# Patient Record
Sex: MALE | Race: WHITE | NOT HISPANIC OR LATINO | Employment: FULL TIME | URBAN - METROPOLITAN AREA
[De-identification: names, ages, dates, MRNs, and addresses within clinical notes are randomized per-mention and may not be internally consistent; named-entity substitution may affect disease eponyms.]

---

## 2021-03-03 DIAGNOSIS — Z23 NEED FOR VACCINATION: ICD-10-CM

## 2021-04-30 ENCOUNTER — HOSPITAL ENCOUNTER (OUTPATIENT)
Dept: LAB | Facility: MEDICAL CENTER | Age: 66
End: 2021-04-30
Attending: FAMILY MEDICINE
Payer: COMMERCIAL

## 2021-04-30 LAB
ALBUMIN SERPL BCP-MCNC: 4.3 G/DL (ref 3.2–4.9)
ALBUMIN/GLOB SERPL: 1.5 G/DL
ALP SERPL-CCNC: 113 U/L (ref 30–99)
ALT SERPL-CCNC: 31 U/L (ref 2–50)
ANION GAP SERPL CALC-SCNC: 9 MMOL/L (ref 7–16)
AST SERPL-CCNC: 26 U/L (ref 12–45)
BILIRUB SERPL-MCNC: 0.3 MG/DL (ref 0.1–1.5)
BUN SERPL-MCNC: 20 MG/DL (ref 8–22)
CALCIUM SERPL-MCNC: 9.3 MG/DL (ref 8.5–10.5)
CHLORIDE SERPL-SCNC: 105 MMOL/L (ref 96–112)
CHOLEST SERPL-MCNC: 208 MG/DL (ref 100–199)
CO2 SERPL-SCNC: 24 MMOL/L (ref 20–33)
CREAT SERPL-MCNC: 0.87 MG/DL (ref 0.5–1.4)
FASTING STATUS PATIENT QL REPORTED: NORMAL
GLOBULIN SER CALC-MCNC: 2.8 G/DL (ref 1.9–3.5)
GLUCOSE SERPL-MCNC: 140 MG/DL (ref 65–99)
HDLC SERPL-MCNC: 32 MG/DL
LDLC SERPL CALC-MCNC: 102 MG/DL
POTASSIUM SERPL-SCNC: 4.2 MMOL/L (ref 3.6–5.5)
PROT SERPL-MCNC: 7.1 G/DL (ref 6–8.2)
PSA SERPL-MCNC: 3.78 NG/ML (ref 0–4)
SODIUM SERPL-SCNC: 138 MMOL/L (ref 135–145)
TRIGL SERPL-MCNC: 372 MG/DL (ref 0–149)
TSH SERPL DL<=0.005 MIU/L-ACNC: 13.8 UIU/ML (ref 0.38–5.33)

## 2021-04-30 PROCEDURE — 82570 ASSAY OF URINE CREATININE: CPT

## 2021-04-30 PROCEDURE — 82043 UR ALBUMIN QUANTITATIVE: CPT

## 2021-04-30 PROCEDURE — 80061 LIPID PANEL: CPT

## 2021-04-30 PROCEDURE — 80053 COMPREHEN METABOLIC PANEL: CPT

## 2021-04-30 PROCEDURE — 36415 COLL VENOUS BLD VENIPUNCTURE: CPT

## 2021-04-30 PROCEDURE — 84443 ASSAY THYROID STIM HORMONE: CPT

## 2021-04-30 PROCEDURE — 84153 ASSAY OF PSA TOTAL: CPT

## 2021-05-01 LAB
CREAT UR-MCNC: 179.09 MG/DL
MICROALBUMIN UR-MCNC: 8.6 MG/DL
MICROALBUMIN/CREAT UR: 48 MG/G (ref 0–30)

## 2021-08-06 ENCOUNTER — HOSPITAL ENCOUNTER (OUTPATIENT)
Dept: LAB | Facility: MEDICAL CENTER | Age: 66
End: 2021-08-06
Attending: FAMILY MEDICINE
Payer: COMMERCIAL

## 2021-08-06 LAB — TSH SERPL DL<=0.005 MIU/L-ACNC: 9.62 UIU/ML (ref 0.38–5.33)

## 2021-08-06 PROCEDURE — 84443 ASSAY THYROID STIM HORMONE: CPT

## 2021-08-06 PROCEDURE — 36415 COLL VENOUS BLD VENIPUNCTURE: CPT

## 2021-11-18 ENCOUNTER — OFFICE VISIT (OUTPATIENT)
Dept: URGENT CARE | Facility: PHYSICIAN GROUP | Age: 66
End: 2021-11-18
Payer: COMMERCIAL

## 2021-11-18 VITALS
RESPIRATION RATE: 20 BRPM | OXYGEN SATURATION: 96 % | TEMPERATURE: 97 F | HEIGHT: 72 IN | DIASTOLIC BLOOD PRESSURE: 88 MMHG | BODY MASS INDEX: 32.51 KG/M2 | HEART RATE: 79 BPM | SYSTOLIC BLOOD PRESSURE: 154 MMHG | WEIGHT: 240 LBS

## 2021-11-18 DIAGNOSIS — R07.9 CHEST PAIN, UNSPECIFIED TYPE: ICD-10-CM

## 2021-11-18 DIAGNOSIS — I10 HYPERTENSION, UNSPECIFIED TYPE: ICD-10-CM

## 2021-11-18 PROCEDURE — 99204 OFFICE O/P NEW MOD 45 MIN: CPT | Performed by: PHYSICIAN ASSISTANT

## 2021-11-18 RX ORDER — LEVOTHYROXINE SODIUM 88 UG/1
TABLET ORAL
COMMUNITY
Start: 2021-11-07

## 2021-11-18 ASSESSMENT — ENCOUNTER SYMPTOMS
DIZZINESS: 0
PALPITATIONS: 0
FEVER: 0
COUGH: 0
BLURRED VISION: 0
VOMITING: 0
NAUSEA: 0
SHORTNESS OF BREATH: 0
CHILLS: 0

## 2021-11-19 NOTE — PROGRESS NOTES
Subjective:     Richard Duane Abend  is a 66 y.o. male who presents for Chest Pain (on and off x 1 week )      Chest Pain   Pertinent negatives include no cough, dizziness, fever, nausea, palpitations, shortness of breath or vomiting.     Patient presents urgent care complaining of last 1 week of chest pain.  He notes intermittent episodes of chest pain he describes as tightness that radiates across the chest.  At the same time he feels hot flashes.  He denies nausea or vomiting associated.  He states his episodes do occur while exerting himself such as going up a flight of stairs or walking uphill.  He denies past medical history of any cardiac episodes.  Denies history of heart attacks or known arrhythmias.  He denies feeling palpitations.  He denies dizziness or visual changes.  He notes past medical history of hypertension without medication management.  He also acknowledges history of elevated cholesterol.  He states he did see his primary care recently and he listened to his heart and stated that it sounds good.  He denies treatments tried.  He states is been going on for 1 week.  He denies cough, respiratory symptoms or any shortness of breath.    Review of Systems   Constitutional: Negative for chills and fever.   Eyes: Negative for blurred vision.   Respiratory: Negative for cough and shortness of breath.    Cardiovascular: Positive for chest pain. Negative for palpitations and leg swelling.   Gastrointestinal: Negative for nausea and vomiting.   Skin: Negative for rash.   Neurological: Negative for dizziness.       Medications:    • levothyroxine Tabs    Allergies: Patient has no known allergies.    Problem List: Richard Duane Abend does not have a problem list on file.    Surgical History:  No past surgical history on file.    Past Social Hx: Richard Duane Abend  reports that he has quit smoking. He has quit using smokeless tobacco. He reports previous alcohol use. He reports that he does not use drugs.      Past Family Hx:  Richard Duane Abend family history is not on file.     Problem list, medications, and allergies reviewed by myself today in Epic.     Objective:   /88 (BP Location: Left arm, Patient Position: Sitting, BP Cuff Size: Large adult)   Pulse 79   Temp 36.1 °C (97 °F) (Temporal)   Resp 20   Ht 1.829 m (6')   Wt 109 kg (240 lb)   SpO2 96%   BMI 32.55 kg/m²     Physical Exam  Vitals and nursing note reviewed.   Constitutional:       General: He is not in acute distress.     Appearance: He is well-developed. He is not diaphoretic.   HENT:      Head: Normocephalic and atraumatic.      Right Ear: External ear normal.      Left Ear: External ear normal.      Nose: Nose normal.   Eyes:      General: No scleral icterus.        Right eye: No discharge.         Left eye: No discharge.      Conjunctiva/sclera: Conjunctivae normal.   Cardiovascular:      Rate and Rhythm: Normal rate and regular rhythm.  No extrasystoles are present.     Pulses: Normal pulses.      Heart sounds: Normal heart sounds.   Pulmonary:      Effort: Pulmonary effort is normal. No respiratory distress.      Breath sounds: Normal breath sounds and air entry.   Musculoskeletal:         General: Normal range of motion.      Cervical back: Neck supple.      Right lower leg: No edema.      Left lower leg: No edema.   Skin:     General: Skin is warm and dry.      Coloration: Skin is not pale.   Neurological:      Mental Status: He is alert and oriented to person, place, and time.      Coordination: Coordination normal.       EKG in the office reveals a normal sinus rhythm with a rate of 58. 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 Diminished T waves laterally.    Assessment/Plan:   Assessment      1. Chest pain, unspecified type  - EKG    2. Hypertension, unspecified type    Other orders  - levothyroxine (SYNTHROID) 88 MCG Tab      Patient has been directed to [facility] ER for further management/work up now,  today.    Patient's wife will transport him to [facility] for further work-up now today.    I have worn an N95 mask, gloves and eye protection for the entire encounter with this patient.     Differential diagnosis, natural history, supportive care, and indications for immediate follow-up discussed.

## 2022-01-28 ENCOUNTER — HOSPITAL ENCOUNTER (OUTPATIENT)
Dept: LAB | Facility: MEDICAL CENTER | Age: 67
End: 2022-01-28
Attending: FAMILY MEDICINE
Payer: COMMERCIAL

## 2022-01-28 ENCOUNTER — HOSPITAL ENCOUNTER (OUTPATIENT)
Dept: LAB | Facility: MEDICAL CENTER | Age: 67
End: 2022-01-28
Attending: INTERNAL MEDICINE
Payer: COMMERCIAL

## 2022-01-28 LAB
CHOLEST SERPL-MCNC: 139 MG/DL (ref 100–199)
FASTING STATUS PATIENT QL REPORTED: NORMAL
HDLC SERPL-MCNC: 33 MG/DL
LDLC SERPL CALC-MCNC: 66 MG/DL
TRIGL SERPL-MCNC: 200 MG/DL (ref 0–149)
TSH SERPL DL<=0.005 MIU/L-ACNC: 5.83 UIU/ML (ref 0.38–5.33)

## 2022-01-28 PROCEDURE — 80061 LIPID PANEL: CPT

## 2022-01-28 PROCEDURE — 84443 ASSAY THYROID STIM HORMONE: CPT

## 2022-01-28 PROCEDURE — 36415 COLL VENOUS BLD VENIPUNCTURE: CPT
